# Patient Record
Sex: MALE | Race: WHITE | NOT HISPANIC OR LATINO | URBAN - METROPOLITAN AREA
[De-identification: names, ages, dates, MRNs, and addresses within clinical notes are randomized per-mention and may not be internally consistent; named-entity substitution may affect disease eponyms.]

---

## 2022-03-23 ENCOUNTER — TELEPHONE (OUTPATIENT)
Dept: HEALTH INFORMATION MANAGEMENT | Facility: OTHER | Age: 67
End: 2022-03-23

## 2023-10-12 ENCOUNTER — TELEPHONE (OUTPATIENT)
Dept: HEALTH INFORMATION MANAGEMENT | Facility: OTHER | Age: 68
End: 2023-10-12

## 2024-08-12 SDOH — HEALTH STABILITY: PHYSICAL HEALTH: ON AVERAGE, HOW MANY DAYS PER WEEK DO YOU ENGAGE IN MODERATE TO STRENUOUS EXERCISE (LIKE A BRISK WALK)?: 3 DAYS

## 2024-08-12 SDOH — ECONOMIC STABILITY: TRANSPORTATION INSECURITY
IN THE PAST 12 MONTHS, HAS THE LACK OF TRANSPORTATION KEPT YOU FROM MEDICAL APPOINTMENTS OR FROM GETTING MEDICATIONS?: NO

## 2024-08-12 SDOH — ECONOMIC STABILITY: FOOD INSECURITY: WITHIN THE PAST 12 MONTHS, THE FOOD YOU BOUGHT JUST DIDN'T LAST AND YOU DIDN'T HAVE MONEY TO GET MORE.: NEVER TRUE

## 2024-08-12 SDOH — ECONOMIC STABILITY: TRANSPORTATION INSECURITY
IN THE PAST 12 MONTHS, HAS LACK OF TRANSPORTATION KEPT YOU FROM MEETINGS, WORK, OR FROM GETTING THINGS NEEDED FOR DAILY LIVING?: NO

## 2024-08-12 SDOH — ECONOMIC STABILITY: HOUSING INSECURITY
IN THE LAST 12 MONTHS, WAS THERE A TIME WHEN YOU DID NOT HAVE A STEADY PLACE TO SLEEP OR SLEPT IN A SHELTER (INCLUDING NOW)?: NO

## 2024-08-12 SDOH — HEALTH STABILITY: PHYSICAL HEALTH: ON AVERAGE, HOW MANY MINUTES DO YOU ENGAGE IN EXERCISE AT THIS LEVEL?: 120 MIN

## 2024-08-12 SDOH — ECONOMIC STABILITY: HOUSING INSECURITY: IN THE LAST 12 MONTHS, HOW MANY PLACES HAVE YOU LIVED?: 1

## 2024-08-12 SDOH — HEALTH STABILITY: MENTAL HEALTH
STRESS IS WHEN SOMEONE FEELS TENSE, NERVOUS, ANXIOUS, OR CAN'T SLEEP AT NIGHT BECAUSE THEIR MIND IS TROUBLED. HOW STRESSED ARE YOU?: ONLY A LITTLE

## 2024-08-12 SDOH — ECONOMIC STABILITY: INCOME INSECURITY: IN THE LAST 12 MONTHS, WAS THERE A TIME WHEN YOU WERE NOT ABLE TO PAY THE MORTGAGE OR RENT ON TIME?: NO

## 2024-08-12 SDOH — ECONOMIC STABILITY: FOOD INSECURITY: WITHIN THE PAST 12 MONTHS, YOU WORRIED THAT YOUR FOOD WOULD RUN OUT BEFORE YOU GOT MONEY TO BUY MORE.: NEVER TRUE

## 2024-08-12 SDOH — ECONOMIC STABILITY: TRANSPORTATION INSECURITY
IN THE PAST 12 MONTHS, HAS LACK OF RELIABLE TRANSPORTATION KEPT YOU FROM MEDICAL APPOINTMENTS, MEETINGS, WORK OR FROM GETTING THINGS NEEDED FOR DAILY LIVING?: NO

## 2024-08-12 ASSESSMENT — SOCIAL DETERMINANTS OF HEALTH (SDOH)
HOW OFTEN DO YOU ATTENT MEETINGS OF THE CLUB OR ORGANIZATION YOU BELONG TO?: MORE THAN 4 TIMES PER YEAR
WITHIN THE PAST 12 MONTHS, YOU WORRIED THAT YOUR FOOD WOULD RUN OUT BEFORE YOU GOT THE MONEY TO BUY MORE: NEVER TRUE
HOW OFTEN DO YOU HAVE SIX OR MORE DRINKS ON ONE OCCASION: NEVER
IN A TYPICAL WEEK, HOW MANY TIMES DO YOU TALK ON THE PHONE WITH FAMILY, FRIENDS, OR NEIGHBORS?: TWICE A WEEK
HOW MANY DRINKS CONTAINING ALCOHOL DO YOU HAVE ON A TYPICAL DAY WHEN YOU ARE DRINKING: 1 OR 2
IN THE PAST 12 MONTHS, HAS THE ELECTRIC, GAS, OIL, OR WATER COMPANY THREATENED TO SHUT OFF SERVICE IN YOUR HOME?: NO
DO YOU BELONG TO ANY CLUBS OR ORGANIZATIONS SUCH AS CHURCH GROUPS UNIONS, FRATERNAL OR ATHLETIC GROUPS, OR SCHOOL GROUPS?: YES
HOW OFTEN DO YOU HAVE A DRINK CONTAINING ALCOHOL: 2-4 TIMES A MONTH
DO YOU BELONG TO ANY CLUBS OR ORGANIZATIONS SUCH AS CHURCH GROUPS UNIONS, FRATERNAL OR ATHLETIC GROUPS, OR SCHOOL GROUPS?: YES
HOW OFTEN DO YOU ATTEND CHURCH OR RELIGIOUS SERVICES?: NEVER
HOW OFTEN DO YOU GET TOGETHER WITH FRIENDS OR RELATIVES?: ONCE A WEEK
HOW OFTEN DO YOU GET TOGETHER WITH FRIENDS OR RELATIVES?: ONCE A WEEK
HOW OFTEN DO YOU ATTENT MEETINGS OF THE CLUB OR ORGANIZATION YOU BELONG TO?: MORE THAN 4 TIMES PER YEAR
IN A TYPICAL WEEK, HOW MANY TIMES DO YOU TALK ON THE PHONE WITH FAMILY, FRIENDS, OR NEIGHBORS?: TWICE A WEEK
HOW OFTEN DO YOU ATTEND CHURCH OR RELIGIOUS SERVICES?: NEVER

## 2024-08-12 ASSESSMENT — LIFESTYLE VARIABLES
HOW OFTEN DO YOU HAVE SIX OR MORE DRINKS ON ONE OCCASION: NEVER
HOW MANY STANDARD DRINKS CONTAINING ALCOHOL DO YOU HAVE ON A TYPICAL DAY: 1 OR 2
AUDIT-C TOTAL SCORE: 2
SKIP TO QUESTIONS 9-10: 1
HOW OFTEN DO YOU HAVE A DRINK CONTAINING ALCOHOL: 2-4 TIMES A MONTH

## 2024-08-15 ENCOUNTER — OFFICE VISIT (OUTPATIENT)
Dept: MEDICAL GROUP | Facility: IMAGING CENTER | Age: 69
End: 2024-08-15
Payer: MEDICARE

## 2024-08-15 VITALS
SYSTOLIC BLOOD PRESSURE: 96 MMHG | TEMPERATURE: 98.4 F | BODY MASS INDEX: 25.09 KG/M2 | OXYGEN SATURATION: 94 % | HEIGHT: 69 IN | DIASTOLIC BLOOD PRESSURE: 60 MMHG | RESPIRATION RATE: 16 BRPM | HEART RATE: 73 BPM | WEIGHT: 169.4 LBS

## 2024-08-15 DIAGNOSIS — Z80.42 FAMILY HISTORY OF PROSTATE CANCER: ICD-10-CM

## 2024-08-15 DIAGNOSIS — Z76.89 ENCOUNTER TO ESTABLISH CARE: ICD-10-CM

## 2024-08-15 DIAGNOSIS — T78.40XA ALLERGY, INITIAL ENCOUNTER: ICD-10-CM

## 2024-08-15 DIAGNOSIS — Z13.1 DIABETES MELLITUS SCREENING: ICD-10-CM

## 2024-08-15 DIAGNOSIS — Z12.5 PROSTATE CANCER SCREENING: ICD-10-CM

## 2024-08-15 DIAGNOSIS — Z13.6 SCREENING FOR CARDIOVASCULAR CONDITION: ICD-10-CM

## 2024-08-15 DIAGNOSIS — Z80.0 FAMILY HISTORY OF COLON CANCER: ICD-10-CM

## 2024-08-15 DIAGNOSIS — Z00.00 ROUTINE GENERAL MEDICAL EXAMINATION AT HEALTH CARE FACILITY: ICD-10-CM

## 2024-08-15 DIAGNOSIS — Z11.59 NEED FOR HEPATITIS C SCREENING TEST: ICD-10-CM

## 2024-08-15 PROCEDURE — 99203 OFFICE O/P NEW LOW 30 MIN: CPT | Performed by: INTERNAL MEDICINE

## 2024-08-15 ASSESSMENT — PATIENT HEALTH QUESTIONNAIRE - PHQ9: CLINICAL INTERPRETATION OF PHQ2 SCORE: 0

## 2024-08-15 NOTE — PROGRESS NOTES
"New Patient to Establish    Reason to establish: New patient to establish    Rashad Andujar is a 69 y.o. male who presents today with the following:    CC:   Chief Complaint   Patient presents with    Establish Care    Seasonal Allergies     Runny nose and itchy eyes        HPI:       His is allergic to dogs, mold, dust, certain foods (chocolate), etc.  He takes care his son's (Tolu) dogs (Shadow and Light) once in a while.   Itchy eyes, runny nose, postnasal drip  OTC Antihistamine such benadryl    GERD, well controlled with lifestyle.    Problem   Family History of Colon Cancer    Mother had colon cancer  He reports he had colonoscopy three years ago (GI consultant in [place]? ) Requests records     Family History of Prostate Cancer    Brother has prostate cancer     Allergies    His is allergic to dogs, mold, dust, certain foods (chocolate), etc.  He takes care his son's (Tolu) dogs (Shadow and Light) once in a while.   Itchy eyes, runny nose, postnasal drip  Flonase did not work well  Sinus rinses prn  OTC Antihistamine such benadryl helps             History of Present Illness          No current outpatient medications on file.    Allergies, past medical history, past surgical history, medications, family history, social history reviewed and updated.    ROS Please see HPI    Physical Exam  BP 96/60 (BP Location: Left arm, Patient Position: Sitting, BP Cuff Size: Adult)   Pulse 73   Temp 36.9 °C (98.4 °F) (Temporal)   Resp 16   Ht 1.753 m (5' 9\")   Wt 76.8 kg (169 lb 6.4 oz)   SpO2 94%   BMI 25.02 kg/m²   Physical Exam  Constitutional:       General: He is not in acute distress.     Appearance: Normal appearance.   HENT:      Head: Normocephalic and atraumatic.      Nose: Nose normal.      Mouth/Throat:      Pharynx: Oropharynx is clear.   Eyes:      Extraocular Movements: Extraocular movements intact.      Conjunctiva/sclera: Conjunctivae normal.   Cardiovascular:      Rate and Rhythm: " Normal rate and regular rhythm.   Pulmonary:      Effort: Pulmonary effort is normal.      Breath sounds: Normal breath sounds.   Abdominal:      General: Bowel sounds are normal.      Palpations: Abdomen is soft.      Tenderness: There is no abdominal tenderness.   Musculoskeletal:      Right lower leg: No edema.      Left lower leg: No edema.   Skin:     Comments: Skin lesions on face and extremities   Neurological:      Mental Status: He is alert. Mental status is at baseline.   Psychiatric:         Mood and Affect: Mood normal.         Behavior: Behavior normal.         Thought Content: Thought content normal.         Judgment: Judgment normal.         Assessment and Plan    1. Encounter to establish care    2. Screening for cardiovascular condition  - Lipid Profile; Future    3. Family history of colon cancer  Mother had colon cancer  He reports he had colonoscopy three years ago (GI consultant in [place]? ) Requests records    4. Family history of prostate cancer  5. Prostate cancer screening  - PROSTATE SPECIFIC AG SCREENING; Future    6. Diabetes mellitus screening  - Comp Metabolic Panel; Future    7. Need for hepatitis C screening test  - HEP C VIRUS ANTIBODY; Future    8. Allergy, initial encounter  Avoid triggers  OTC antihistamine prn    9. Routine general medical examination at health care facility  - CBC WITH DIFFERENTIAL; Future  - Comp Metabolic Panel; Future  - TSH WITH REFLEX TO FT4; Future  - URINALYSIS,CULTURE IF INDICATED; Future    He follows derm for his skin lesions      Follow-up:Return in about 1 month (around 9/15/2024), or if symptoms worsen or fail to improve.    This note was created using voice recognition software. There may be unintended errors in spelling, grammar or content.

## 2024-08-15 NOTE — ASSESSMENT & PLAN NOTE
Mother had colon cancer  He reports he had colonoscopy three years ago (GI consultant in [place]? ) Requests records

## 2024-08-15 NOTE — ASSESSMENT & PLAN NOTE
His is allergic to dogs, mold, dust, certain foods (chocolate), etc.  He takes care his son's (Tolu) dogs (Shadow and Light) once in a while.   Itchy eyes, runny nose, postnasal drip  Flonase did not work well  Sinus rinses prn  OTC Antihistamine such benadryl helps

## 2024-08-26 ENCOUNTER — APPOINTMENT (OUTPATIENT)
Dept: LAB | Facility: MEDICAL CENTER | Age: 69
End: 2024-08-26
Payer: MEDICARE

## 2024-09-03 ENCOUNTER — HOSPITAL ENCOUNTER (OUTPATIENT)
Dept: LAB | Facility: MEDICAL CENTER | Age: 69
End: 2024-09-03
Attending: INTERNAL MEDICINE
Payer: MEDICARE

## 2024-09-03 DIAGNOSIS — Z11.59 NEED FOR HEPATITIS C SCREENING TEST: ICD-10-CM

## 2024-09-03 DIAGNOSIS — Z12.5 PROSTATE CANCER SCREENING: ICD-10-CM

## 2024-09-03 DIAGNOSIS — Z13.6 SCREENING FOR CARDIOVASCULAR CONDITION: ICD-10-CM

## 2024-09-03 DIAGNOSIS — Z13.1 DIABETES MELLITUS SCREENING: ICD-10-CM

## 2024-09-03 DIAGNOSIS — Z00.00 ROUTINE GENERAL MEDICAL EXAMINATION AT HEALTH CARE FACILITY: ICD-10-CM

## 2024-09-03 DIAGNOSIS — Z80.42 FAMILY HISTORY OF PROSTATE CANCER: ICD-10-CM

## 2024-09-03 DIAGNOSIS — R97.20 ELEVATED PSA: ICD-10-CM

## 2024-09-03 DIAGNOSIS — R31.21 ASYMPTOMATIC MICROSCOPIC HEMATURIA: ICD-10-CM

## 2024-09-03 LAB
ALBUMIN SERPL BCP-MCNC: 3.8 G/DL (ref 3.2–4.9)
ALBUMIN/GLOB SERPL: 1.5 G/DL
ALP SERPL-CCNC: 61 U/L (ref 30–99)
ALT SERPL-CCNC: 12 U/L (ref 2–50)
ANION GAP SERPL CALC-SCNC: 10 MMOL/L (ref 7–16)
APPEARANCE UR: CLEAR
AST SERPL-CCNC: 19 U/L (ref 12–45)
BACTERIA #/AREA URNS HPF: NEGATIVE /HPF
BASOPHILS # BLD AUTO: 0.7 % (ref 0–1.8)
BASOPHILS # BLD: 0.04 K/UL (ref 0–0.12)
BILIRUB SERPL-MCNC: 0.9 MG/DL (ref 0.1–1.5)
BILIRUB UR QL STRIP.AUTO: NEGATIVE
BUN SERPL-MCNC: 17 MG/DL (ref 8–22)
CALCIUM ALBUM COR SERPL-MCNC: 8.9 MG/DL (ref 8.5–10.5)
CALCIUM SERPL-MCNC: 8.7 MG/DL (ref 8.5–10.5)
CHLORIDE SERPL-SCNC: 105 MMOL/L (ref 96–112)
CHOLEST SERPL-MCNC: 159 MG/DL (ref 100–199)
CO2 SERPL-SCNC: 23 MMOL/L (ref 20–33)
COLOR UR: YELLOW
CREAT SERPL-MCNC: 1.1 MG/DL (ref 0.5–1.4)
EOSINOPHIL # BLD AUTO: 0.25 K/UL (ref 0–0.51)
EOSINOPHIL NFR BLD: 4.5 % (ref 0–6.9)
EPI CELLS #/AREA URNS HPF: NEGATIVE /HPF
ERYTHROCYTE [DISTWIDTH] IN BLOOD BY AUTOMATED COUNT: 43.4 FL (ref 35.9–50)
GFR SERPLBLD CREATININE-BSD FMLA CKD-EPI: 73 ML/MIN/1.73 M 2
GLOBULIN SER CALC-MCNC: 2.6 G/DL (ref 1.9–3.5)
GLUCOSE SERPL-MCNC: 98 MG/DL (ref 65–99)
GLUCOSE UR STRIP.AUTO-MCNC: NEGATIVE MG/DL
HCT VFR BLD AUTO: 45.7 % (ref 42–52)
HCV AB SER QL: NORMAL
HDLC SERPL-MCNC: 42 MG/DL
HGB BLD-MCNC: 15.6 G/DL (ref 14–18)
HYALINE CASTS #/AREA URNS LPF: ABNORMAL /LPF
IMM GRANULOCYTES # BLD AUTO: 0.01 K/UL (ref 0–0.11)
IMM GRANULOCYTES NFR BLD AUTO: 0.2 % (ref 0–0.9)
KETONES UR STRIP.AUTO-MCNC: 15 MG/DL
LDLC SERPL CALC-MCNC: 108 MG/DL
LEUKOCYTE ESTERASE UR QL STRIP.AUTO: NEGATIVE
LYMPHOCYTES # BLD AUTO: 1.33 K/UL (ref 1–4.8)
LYMPHOCYTES NFR BLD: 24.1 % (ref 22–41)
MCH RBC QN AUTO: 32 PG (ref 27–33)
MCHC RBC AUTO-ENTMCNC: 34.1 G/DL (ref 32.3–36.5)
MCV RBC AUTO: 93.8 FL (ref 81.4–97.8)
MICRO URNS: ABNORMAL
MONOCYTES # BLD AUTO: 0.4 K/UL (ref 0–0.85)
MONOCYTES NFR BLD AUTO: 7.3 % (ref 0–13.4)
NEUTROPHILS # BLD AUTO: 3.48 K/UL (ref 1.82–7.42)
NEUTROPHILS NFR BLD: 63.2 % (ref 44–72)
NITRITE UR QL STRIP.AUTO: NEGATIVE
NRBC # BLD AUTO: 0 K/UL
NRBC BLD-RTO: 0 /100 WBC (ref 0–0.2)
PH UR STRIP.AUTO: 5.5 [PH] (ref 5–8)
PLATELET # BLD AUTO: 205 K/UL (ref 164–446)
PMV BLD AUTO: 11.2 FL (ref 9–12.9)
POTASSIUM SERPL-SCNC: 4.5 MMOL/L (ref 3.6–5.5)
PROT SERPL-MCNC: 6.4 G/DL (ref 6–8.2)
PROT UR QL STRIP: NEGATIVE MG/DL
PSA SERPL-MCNC: 6.03 NG/ML (ref 0–4)
RBC # BLD AUTO: 4.87 M/UL (ref 4.7–6.1)
RBC # URNS HPF: ABNORMAL /HPF
RBC UR QL AUTO: ABNORMAL
SODIUM SERPL-SCNC: 138 MMOL/L (ref 135–145)
SP GR UR STRIP.AUTO: 1.03
TRIGL SERPL-MCNC: 47 MG/DL (ref 0–149)
TSH SERPL DL<=0.005 MIU/L-ACNC: 0.94 UIU/ML (ref 0.38–5.33)
UROBILINOGEN UR STRIP.AUTO-MCNC: 0.2 MG/DL
WBC # BLD AUTO: 5.5 K/UL (ref 4.8–10.8)
WBC #/AREA URNS HPF: ABNORMAL /HPF

## 2024-09-03 PROCEDURE — 80061 LIPID PANEL: CPT

## 2024-09-03 PROCEDURE — 84443 ASSAY THYROID STIM HORMONE: CPT

## 2024-09-03 PROCEDURE — 81001 URINALYSIS AUTO W/SCOPE: CPT

## 2024-09-03 PROCEDURE — 84153 ASSAY OF PSA TOTAL: CPT

## 2024-09-03 PROCEDURE — 86803 HEPATITIS C AB TEST: CPT

## 2024-09-03 PROCEDURE — 85025 COMPLETE CBC W/AUTO DIFF WBC: CPT

## 2024-09-03 PROCEDURE — 80053 COMPREHEN METABOLIC PANEL: CPT

## 2024-09-03 PROCEDURE — 36415 COLL VENOUS BLD VENIPUNCTURE: CPT

## 2024-09-04 NOTE — PROGRESS NOTES
Diagnoses and all orders for this visit:    Elevated PSA  -     Referral to Urology   Latest Reference Range & Units 09/03/24 12:22   Prostatic Specific Antigen Tot 0.00 - 4.00 ng/mL 6.03 (H)   (H): Data is abnormally high    Asymptomatic microscopic hematuria  -     Referral to Urology   Latest Reference Range & Units 09/03/24 12:22   Color  Yellow   Character  Clear   Specific Gravity <1.035  1.027   Ph 5.0 - 8.0  5.5   Glucose Negative mg/dL Negative   Ketones Negative mg/dL 15 !   Bilirubin Negative  Negative   Occult Blood Negative  Trace !   Protein Negative mg/dL Negative   Nitrite Negative  Negative   Leukocyte Esterase Negative  Negative   Urobilinogen, Urine Negative  0.2   Micro Urine Req  Microscopic   WBC /hpf 0-2 !   RBC /hpf 2-5 !   Epithelial Cells /hpf Negative   Bacteria None /hpf Negative   Hyaline Cast /lpf 0-2   !: Data is abnormal      Family history of prostate cancer  His brother has prostate cancer  -     Referral to Urology

## 2024-09-12 ENCOUNTER — HOSPITAL ENCOUNTER (OUTPATIENT)
Dept: LAB | Facility: MEDICAL CENTER | Age: 69
End: 2024-09-12
Attending: UROLOGY
Payer: MEDICARE

## 2024-09-12 LAB — PSA SERPL-MCNC: 4.91 NG/ML (ref 0–4)

## 2024-09-12 PROCEDURE — 84153 ASSAY OF PSA TOTAL: CPT

## 2024-09-12 PROCEDURE — 36415 COLL VENOUS BLD VENIPUNCTURE: CPT

## 2024-10-11 ENCOUNTER — APPOINTMENT (OUTPATIENT)
Dept: MEDICAL GROUP | Facility: IMAGING CENTER | Age: 69
End: 2024-10-11
Payer: MEDICARE

## 2024-10-11 VITALS
SYSTOLIC BLOOD PRESSURE: 112 MMHG | HEIGHT: 69 IN | DIASTOLIC BLOOD PRESSURE: 78 MMHG | WEIGHT: 167 LBS | OXYGEN SATURATION: 96 % | BODY MASS INDEX: 24.73 KG/M2 | RESPIRATION RATE: 16 BRPM | HEART RATE: 65 BPM | TEMPERATURE: 97.8 F

## 2024-10-11 DIAGNOSIS — R82.90 ABNORMAL FINDING ON URINALYSIS: ICD-10-CM

## 2024-10-11 DIAGNOSIS — E78.00 PURE HYPERCHOLESTEROLEMIA: ICD-10-CM

## 2024-10-11 DIAGNOSIS — Z23 NEED FOR VACCINATION: ICD-10-CM

## 2024-10-11 DIAGNOSIS — R97.20 ELEVATED PSA: ICD-10-CM

## 2024-10-11 PROCEDURE — G0008 ADMIN INFLUENZA VIRUS VAC: HCPCS | Performed by: INTERNAL MEDICINE

## 2024-10-11 PROCEDURE — 90662 IIV NO PRSV INCREASED AG IM: CPT | Performed by: INTERNAL MEDICINE

## 2024-10-11 PROCEDURE — 99214 OFFICE O/P EST MOD 30 MIN: CPT | Mod: 25 | Performed by: INTERNAL MEDICINE

## 2024-10-11 ASSESSMENT — FIBROSIS 4 INDEX: FIB4 SCORE: 1.79

## 2024-11-08 ENCOUNTER — APPOINTMENT (OUTPATIENT)
Dept: RADIOLOGY | Facility: MEDICAL CENTER | Age: 69
End: 2024-11-08
Attending: UROLOGY
Payer: MEDICARE

## 2024-11-08 DIAGNOSIS — R97.20 ELEVATED PROSTATE SPECIFIC ANTIGEN (PSA): ICD-10-CM

## 2024-11-08 PROCEDURE — A9579 GAD-BASE MR CONTRAST NOS,1ML: HCPCS | Mod: JZ | Performed by: UROLOGY

## 2024-11-08 PROCEDURE — 700117 HCHG RX CONTRAST REV CODE 255: Mod: JZ | Performed by: UROLOGY

## 2024-11-08 PROCEDURE — 700111 HCHG RX REV CODE 636 W/ 250 OVERRIDE (IP): Mod: JZ,JG | Performed by: RADIOLOGY

## 2024-11-08 PROCEDURE — 72197 MRI PELVIS W/O & W/DYE: CPT

## 2024-11-08 RX ADMIN — GADOTERIDOL 15 ML: 279.3 INJECTION, SOLUTION INTRAVENOUS at 10:48

## 2024-11-08 RX ADMIN — GLUCAGON 1 MG: KIT at 09:45

## 2024-11-18 ENCOUNTER — TELEPHONE (OUTPATIENT)
Dept: HEALTH INFORMATION MANAGEMENT | Facility: OTHER | Age: 69
End: 2024-11-18
Payer: MEDICARE

## 2024-12-16 ENCOUNTER — TELEPHONE (OUTPATIENT)
Dept: MEDICAL GROUP | Facility: IMAGING CENTER | Age: 69
End: 2024-12-16
Payer: MEDICARE

## 2024-12-16 NOTE — TELEPHONE ENCOUNTER
Patient called requesting a referral to [facility]. He already has a appointment scheduled with them.

## 2024-12-17 DIAGNOSIS — T78.40XA ALLERGY, INITIAL ENCOUNTER: ICD-10-CM

## 2024-12-17 NOTE — PROGRESS NOTES
His is allergic to dogs, mold, dust, certain foods (chocolate), etc.  He takes care his son's (Tolu) dogs (Shadow and Light) once in a while.   Itchy eyes, runny nose, postnasal drip  Flonase did not work well  Sinus rinses prn  OTC Antihistamine such benadryl helps    Patient would like to see allergy specialist      Allergy, initial encounter  -     Referral to Allergy

## 2025-02-13 ENCOUNTER — APPOINTMENT (OUTPATIENT)
Dept: MEDICAL GROUP | Facility: IMAGING CENTER | Age: 70
End: 2025-02-13
Payer: MEDICARE

## 2025-02-13 VITALS
HEIGHT: 69 IN | WEIGHT: 167.4 LBS | TEMPERATURE: 97.8 F | RESPIRATION RATE: 16 BRPM | DIASTOLIC BLOOD PRESSURE: 68 MMHG | BODY MASS INDEX: 24.79 KG/M2 | SYSTOLIC BLOOD PRESSURE: 102 MMHG | OXYGEN SATURATION: 97 % | HEART RATE: 68 BPM

## 2025-02-13 DIAGNOSIS — M79.604 PAIN OF RIGHT LOWER EXTREMITY: ICD-10-CM

## 2025-02-13 DIAGNOSIS — S89.91XA INJURY OF RIGHT LOWER EXTREMITY, INITIAL ENCOUNTER: ICD-10-CM

## 2025-02-13 DIAGNOSIS — Z12.83 SKIN CANCER SCREENING: ICD-10-CM

## 2025-02-13 DIAGNOSIS — E78.00 PURE HYPERCHOLESTEROLEMIA: ICD-10-CM

## 2025-02-13 DIAGNOSIS — T14.8XXA BRUISING: ICD-10-CM

## 2025-02-13 PROCEDURE — 3074F SYST BP LT 130 MM HG: CPT | Performed by: INTERNAL MEDICINE

## 2025-02-13 PROCEDURE — 3078F DIAST BP <80 MM HG: CPT | Performed by: INTERNAL MEDICINE

## 2025-02-13 PROCEDURE — 99214 OFFICE O/P EST MOD 30 MIN: CPT | Performed by: INTERNAL MEDICINE

## 2025-02-13 RX ORDER — TAMSULOSIN HYDROCHLORIDE 0.4 MG/1
0.4 CAPSULE ORAL
COMMUNITY
Start: 2025-01-14

## 2025-02-13 ASSESSMENT — FIBROSIS 4 INDEX: FIB4 SCORE: 1.79

## 2025-02-13 ASSESSMENT — PATIENT HEALTH QUESTIONNAIRE - PHQ9: CLINICAL INTERPRETATION OF PHQ2 SCORE: 0

## 2025-02-13 NOTE — PROGRESS NOTES
"Established Patient    Rashad Andujar is a 69 y.o. male who presents today with the following:    CC:   Chief Complaint   Patient presents with    Follow-Up    Other     Pt report pulling hamstring right leg, on Friday. Has not been seen elsewhere for this yet       HPI:     Verbal consent was acquired by the patient to use Art[employer] ambient listening note generation during this visit Yes     History of Present Illness  The patient is a 52-year-old male presenting with a right hamstring injury.    Right Hamstring Injury  He injured his right hamstring on 02/07/2025 while skiing. He has bruising in the posterior thigh and calf, with pain in the hamstring and calves unrelieved by icing and ibuprofen. Stretching worsens the pain, but leg strength is unchanged.  - Onset: Injured on 02/07/2025 while skiing.  - Location: Right hamstring, posterior thigh, and calf.  - Character: Bruising and pain in the hamstring and calves.  - Alleviating/Aggravating Factors: Pain unrelieved by icing and ibuprofen; stretching worsens the pain.  - Severity: Pain persists despite icing and ibuprofen; leg strength is unchanged.    MEDICATIONS  - Ibuprofen    No problems updated.     Current Outpatient Medications   Medication Sig    tamsulosin (FLOMAX) 0.4 MG capsule Take 0.4 mg by mouth every day.     No Known Allergies    Allergies, past medical history, past surgical history, medications, family history, social history reviewed and updated.    ROS Please see HPI    Physical Exam  Vitals: /68 (BP Location: Left arm, Patient Position: Sitting, BP Cuff Size: Adult)   Pulse 68   Temp 36.6 °C (97.8 °F) (Temporal)   Resp 16   Ht 1.753 m (5' 9\")   Wt 75.9 kg (167 lb 6.4 oz)   SpO2 97%   BMI 24.72 kg/m²   Physical Exam  Constitutional:       General: He is not in acute distress.     Appearance: Normal appearance.   HENT:      Head: Normocephalic and atraumatic.      Nose: Nose normal.      Mouth/Throat:      Pharynx: " Oropharynx is clear.   Eyes:      Extraocular Movements: Extraocular movements intact.      Conjunctiva/sclera: Conjunctivae normal.   Cardiovascular:      Rate and Rhythm: Normal rate and regular rhythm.   Pulmonary:      Effort: Pulmonary effort is normal.      Breath sounds: Normal breath sounds.   Abdominal:      General: Bowel sounds are normal.      Palpations: Abdomen is soft.      Tenderness: There is no abdominal tenderness.   Musculoskeletal:      Right lower leg: No edema.      Left lower leg: No edema.      Comments: Tenderness of posterior right thigh and calves, bruises on right posterior right thigh and right calf   Neurological:      Mental Status: He is alert. Mental status is at baseline.   Psychiatric:         Mood and Affect: Mood normal.         Behavior: Behavior normal.         Thought Content: Thought content normal.         Judgment: Judgment normal.             Assessment and Plan    Assessment & Plan      Rashad was seen today for follow-up and other.    Diagnoses and all orders for this visit:    Injury of right lower extremity, initial encounter  Pain of right lower extremity  - Rest, Ice, Compression, Elevation  - consider switching to heating  -  Continue taking ibuprofen or tylenol as needed for pain relief.  -     Referral to Orthopedics  -     US-EXTREMITY VENOUS LOWER UNILAT RIGHT; Future    Skin cancer screening  -     Referral to Dermatology    Pure hypercholesterolemia  -     Lipid Profile; Future  -     Comp Metabolic Panel; Future  Healthful lifestyle measures    Bruising  -     Comp Metabolic Panel; Future  -     Prothrombin Time; Future  -     CBC WITH DIFFERENTIAL; Future  Due to injury      Follow-up:Return in about 3 months (around 5/13/2025), or if symptoms worsen or fail to improve.    This note was created using voice recognition software. There may be unintended errors in spelling, grammar or content.

## 2025-02-13 NOTE — Clinical Note
REFERRAL APPROVAL NOTICE         Sent on February 13, 2025                   Rashad Andujar  [address] [place] [address]                   Dear Mr. Andujar,    After a careful review of the medical information and benefit coverage, [facility] has processed your referral. See below for additional details.    If applicable, you must be actively enrolled with your insurance for coverage of the authorized service. If you have any questions regarding your coverage, please contact your insurance directly.    REFERRAL INFORMATION   Referral #:  [id number]  Referred-To Provider    Referred-By Provider:  Dermatology    DANIEL Abraham MARTIN E      [address] [place] [address] [address] [place] [address]  [phone] [address]  [place] [address]  [phone]    Referral Start Date:  02/13/2025  Referral End Date:   02/13/2026             SCHEDULING  If you do not already have an appointment, please call [phone] to make an appointment.     MORE INFORMATION  If you do not already have a [employer] account, sign up at: [email or web address]  You can access your medical information, make appointments, see lab results, billing information, and more.  If you have questions regarding this referral, please contact  the [facility] Referrals department at:             [phone]. Monday - Friday 8:00AM - 5:00PM.     Sincerely,    [facility]

## 2025-02-13 NOTE — Clinical Note
REFERRAL APPROVAL NOTICE         Sent on February 13, 2025                   Rashad Andujar  [address] [place] [address]                   Dear Mr. Andujar,    After a careful review of the medical information and benefit coverage, [facility] has processed your referral. See below for additional details.    If applicable, you must be actively enrolled with your insurance for coverage of the authorized service. If you have any questions regarding your coverage, please contact your insurance directly.    REFERRAL INFORMATION   Referral #:  [id number]  Referred-To Department    Referred-By Provider:  Orthopedics    Tory Marion M.D.   [facility] Main Totals (joint)      [address] [place] [address]  [phone] [address] [place] [address]  [place] [id number] [phone]    Referral Start Date:  02/13/2025  Referral End Date:   02/13/2026             SCHEDULING  If you do not already have an appointment, please call [phone] to make an appointment.     MORE INFORMATION  If you do not already have a [employer] account, sign up at: [email or web address]  You can access your medical information, make appointments, see lab results, billing information, and more.  If you have questions regarding this referral, please contact  the [facility] Referrals department at:             [phone]. Monday - Friday 8:00AM - 5:00PM.     Sincerely,    [facility]

## 2025-04-30 ENCOUNTER — TELEPHONE (OUTPATIENT)
Dept: MEDICAL GROUP | Facility: IMAGING CENTER | Age: 70
End: 2025-04-30
Payer: MEDICARE

## 2025-04-30 DIAGNOSIS — R97.20 ELEVATED PSA: ICD-10-CM

## 2025-04-30 NOTE — TELEPHONE ENCOUNTER
Caller Name: Rashad Fulton  Call Back Number:     How would the patient prefer to be contacted with a response: Phone call do NOT leave a detailed message    Pt requesting a PSA lab order

## 2025-05-01 ENCOUNTER — HOSPITAL ENCOUNTER (OUTPATIENT)
Dept: LAB | Facility: MEDICAL CENTER | Age: 70
End: 2025-05-01
Attending: INTERNAL MEDICINE
Payer: MEDICARE

## 2025-05-01 ENCOUNTER — RESULTS FOLLOW-UP (OUTPATIENT)
Dept: MEDICAL GROUP | Facility: IMAGING CENTER | Age: 70
End: 2025-05-01

## 2025-05-01 DIAGNOSIS — R82.90 ABNORMAL FINDING ON URINALYSIS: ICD-10-CM

## 2025-05-01 DIAGNOSIS — T14.8XXA BRUISING: ICD-10-CM

## 2025-05-01 DIAGNOSIS — E78.00 PURE HYPERCHOLESTEROLEMIA: ICD-10-CM

## 2025-05-01 DIAGNOSIS — R97.20 ELEVATED PSA: ICD-10-CM

## 2025-05-01 LAB
ALBUMIN SERPL BCP-MCNC: 4 G/DL (ref 3.2–4.9)
ALBUMIN/GLOB SERPL: 1.5 G/DL
ALP SERPL-CCNC: 60 U/L (ref 30–99)
ALT SERPL-CCNC: 17 U/L (ref 2–50)
ANION GAP SERPL CALC-SCNC: 9 MMOL/L (ref 7–16)
APPEARANCE UR: CLEAR
AST SERPL-CCNC: 25 U/L (ref 12–45)
BASOPHILS # BLD AUTO: 0.9 % (ref 0–1.8)
BASOPHILS # BLD: 0.05 K/UL (ref 0–0.12)
BILIRUB SERPL-MCNC: 0.8 MG/DL (ref 0.1–1.5)
BILIRUB UR QL STRIP.AUTO: NEGATIVE
BUN SERPL-MCNC: 17 MG/DL (ref 8–22)
CALCIUM ALBUM COR SERPL-MCNC: 9.1 MG/DL (ref 8.5–10.5)
CALCIUM SERPL-MCNC: 9.1 MG/DL (ref 8.5–10.5)
CHLORIDE SERPL-SCNC: 106 MMOL/L (ref 96–112)
CHOLEST SERPL-MCNC: 186 MG/DL (ref 100–199)
CO2 SERPL-SCNC: 25 MMOL/L (ref 20–33)
COLOR UR: YELLOW
CREAT SERPL-MCNC: 1.23 MG/DL (ref 0.5–1.4)
EOSINOPHIL # BLD AUTO: 0.31 K/UL (ref 0–0.51)
EOSINOPHIL NFR BLD: 5.8 % (ref 0–6.9)
ERYTHROCYTE [DISTWIDTH] IN BLOOD BY AUTOMATED COUNT: 44.5 FL (ref 35.9–50)
FASTING STATUS PATIENT QL REPORTED: NORMAL
GFR SERPLBLD CREATININE-BSD FMLA CKD-EPI: 64 ML/MIN/1.73 M 2
GLOBULIN SER CALC-MCNC: 2.6 G/DL (ref 1.9–3.5)
GLUCOSE SERPL-MCNC: 92 MG/DL (ref 65–99)
GLUCOSE UR STRIP.AUTO-MCNC: NEGATIVE MG/DL
HCT VFR BLD AUTO: 49.6 % (ref 42–52)
HDLC SERPL-MCNC: 46 MG/DL
HGB BLD-MCNC: 16.8 G/DL (ref 14–18)
IMM GRANULOCYTES # BLD AUTO: 0.02 K/UL (ref 0–0.11)
IMM GRANULOCYTES NFR BLD AUTO: 0.4 % (ref 0–0.9)
INR PPP: 1 (ref 0.87–1.13)
KETONES UR STRIP.AUTO-MCNC: NEGATIVE MG/DL
LDLC SERPL CALC-MCNC: 125 MG/DL
LEUKOCYTE ESTERASE UR QL STRIP.AUTO: NEGATIVE
LYMPHOCYTES # BLD AUTO: 1.6 K/UL (ref 1–4.8)
LYMPHOCYTES NFR BLD: 30 % (ref 22–41)
MCH RBC QN AUTO: 31.8 PG (ref 27–33)
MCHC RBC AUTO-ENTMCNC: 33.9 G/DL (ref 32.3–36.5)
MCV RBC AUTO: 93.9 FL (ref 81.4–97.8)
MICRO URNS: NORMAL
MONOCYTES # BLD AUTO: 0.41 K/UL (ref 0–0.85)
MONOCYTES NFR BLD AUTO: 7.7 % (ref 0–13.4)
NEUTROPHILS # BLD AUTO: 2.95 K/UL (ref 1.82–7.42)
NEUTROPHILS NFR BLD: 55.2 % (ref 44–72)
NITRITE UR QL STRIP.AUTO: NEGATIVE
NRBC # BLD AUTO: 0 K/UL
NRBC BLD-RTO: 0 /100 WBC (ref 0–0.2)
PH UR STRIP.AUTO: 7 [PH] (ref 5–8)
PLATELET # BLD AUTO: 229 K/UL (ref 164–446)
PMV BLD AUTO: 11.1 FL (ref 9–12.9)
POTASSIUM SERPL-SCNC: 4.2 MMOL/L (ref 3.6–5.5)
PROT SERPL-MCNC: 6.6 G/DL (ref 6–8.2)
PROT UR QL STRIP: NEGATIVE MG/DL
PROTHROMBIN TIME: 13.2 SEC (ref 12–14.6)
PSA SERPL DL<=0.01 NG/ML-MCNC: 4.85 NG/ML (ref 0–4)
RBC # BLD AUTO: 5.28 M/UL (ref 4.7–6.1)
RBC UR QL AUTO: NEGATIVE
SODIUM SERPL-SCNC: 140 MMOL/L (ref 135–145)
SP GR UR STRIP.AUTO: 1.02
TRIGL SERPL-MCNC: 76 MG/DL (ref 0–149)
UROBILINOGEN UR STRIP.AUTO-MCNC: 1 EU/DL
WBC # BLD AUTO: 5.3 K/UL (ref 4.8–10.8)

## 2025-05-01 PROCEDURE — 36415 COLL VENOUS BLD VENIPUNCTURE: CPT

## 2025-05-01 PROCEDURE — 85610 PROTHROMBIN TIME: CPT

## 2025-05-01 PROCEDURE — 84153 ASSAY OF PSA TOTAL: CPT

## 2025-05-01 PROCEDURE — 80053 COMPREHEN METABOLIC PANEL: CPT

## 2025-05-01 PROCEDURE — 81003 URINALYSIS AUTO W/O SCOPE: CPT

## 2025-05-01 PROCEDURE — 85025 COMPLETE CBC W/AUTO DIFF WBC: CPT

## 2025-05-01 PROCEDURE — 80061 LIPID PANEL: CPT

## 2025-05-15 ENCOUNTER — HOSPITAL ENCOUNTER (OUTPATIENT)
Facility: MEDICAL CENTER | Age: 70
End: 2025-05-15
Payer: MEDICARE

## 2025-05-15 LAB
APPEARANCE UR: CLEAR
BILIRUB UR QL STRIP.AUTO: NEGATIVE
COLOR UR: YELLOW
GLUCOSE UR STRIP.AUTO-MCNC: NEGATIVE MG/DL
KETONES UR STRIP.AUTO-MCNC: NEGATIVE MG/DL
LEUKOCYTE ESTERASE UR QL STRIP.AUTO: NEGATIVE
MICRO URNS: NORMAL
NITRITE UR QL STRIP.AUTO: NEGATIVE
PH UR STRIP.AUTO: 7 [PH] (ref 5–8)
PROT UR QL STRIP: NEGATIVE MG/DL
RBC UR QL AUTO: NEGATIVE
SP GR UR STRIP.AUTO: 1.02
UROBILINOGEN UR STRIP.AUTO-MCNC: 0.2 EU/DL

## 2025-05-15 PROCEDURE — 81003 URINALYSIS AUTO W/O SCOPE: CPT

## 2025-05-17 ENCOUNTER — OFFICE VISIT (OUTPATIENT)
Dept: URGENT CARE | Facility: CLINIC | Age: 70
End: 2025-05-17
Payer: MEDICARE

## 2025-05-17 VITALS
DIASTOLIC BLOOD PRESSURE: 70 MMHG | BODY MASS INDEX: 24.44 KG/M2 | WEIGHT: 165 LBS | TEMPERATURE: 98.1 F | HEART RATE: 74 BPM | RESPIRATION RATE: 18 BRPM | SYSTOLIC BLOOD PRESSURE: 128 MMHG | HEIGHT: 69 IN | OXYGEN SATURATION: 96 %

## 2025-05-17 DIAGNOSIS — L03.012 PARONYCHIA OF FINGER OF LEFT HAND: Primary | ICD-10-CM

## 2025-05-17 PROCEDURE — 99213 OFFICE O/P EST LOW 20 MIN: CPT

## 2025-05-17 PROCEDURE — 3074F SYST BP LT 130 MM HG: CPT

## 2025-05-17 PROCEDURE — 3078F DIAST BP <80 MM HG: CPT

## 2025-05-17 RX ORDER — ALBUTEROL SULFATE 90 UG/1
INHALANT RESPIRATORY (INHALATION)
COMMUNITY

## 2025-05-17 ASSESSMENT — FIBROSIS 4 INDEX: FIB4 SCORE: 1.8

## 2025-05-17 NOTE — PROGRESS NOTES
"Subjective:   Rashad Andujar is a 70 y.o. male who presents for Nail Problem (Swollen, redness, not draining, believes the nail is involved, x 2 days, Lt middle finger )      HPI:    Patient presents to urgent care with his SO with concerns of pain, tenderness, swelling the lateral fold of the left middle finger. Has been soaking finger in hydrogen peroxide. Denies fever, chills, nausea. .     ROS As above in HPI    Medications:    Medications Ordered Prior to Encounter[1]     Allergies:   Patient has no known allergies.    Problem List:   Problem List[2]     Surgical History:  Past Surgical History:   Procedure Laterality Date    CATARACT PHACO WITH IOL  02/26/2013    Performed by Ignacio Zhang M.D. at SUR[facility]ERY [facility] ORS    EGD WITH ASP/BX  11/07/2008    Performed by RIO HOYOS at [facility] ORS    ORIF, FRACTURE, HUMERUS  01/01/1988    left    BOWEL RESECTION      ORIF, FRACTURE, FIBULA Left        Past Social Hx:   Social History[3]       Problem list, medications, and allergies reviewed by myself today in Epic.     Objective:     /70 (BP Location: Left arm, Patient Position: Sitting, BP Cuff Size: Adult)   Pulse 74   Temp 36.7 °C (98.1 °F) (Temporal)   Resp 18   Ht 1.753 m (5' 9\")   Wt 74.8 kg (165 lb)   SpO2 96%   BMI 24.37 kg/m²     Physical Exam  Vitals and nursing note reviewed.   Constitutional:       Appearance: Normal appearance.   HENT:      Head: Normocephalic.   Cardiovascular:      Rate and Rhythm: Normal rate and regular rhythm.      Heart sounds: Normal heart sounds.   Pulmonary:      Effort: Pulmonary effort is normal.      Breath sounds: Normal breath sounds.   Musculoskeletal:        Hands:    Skin:     Comments: Medial nailfold of left 3rd finger is edematous, erythematous, warm, and tender to palpation. Not fluctuant. No drainage present.   Neurological:      Mental Status: He is alert.         Assessment/Plan:       Diagnosis and associated " orders:   1. Paronychia of finger of left hand  - amoxicillin-clavulanate (AUGMENTIN) 875-125 MG Tab; Take 1 Tablet by mouth 2 times a day for 7 days.  Dispense: 14 Tablet; Refill: 0    Other orders  - albuterol 108 (90 Base) MCG/ACT Aero Soln inhalation aerosol; INHALE 2 PUFFS BY MOUTH EVERY 4 TO 6 HOURS AS NEEDED        Comments/MDM:     Paronychia without significant abscess of left 3rd finger  Home wound care instructions reviewed: Wash with unscented soap twice a day pat dry, apply Bactroban or Neosporin to cuticle twice a day.  Soak hand in warm water times a day.  May use NSAIDs/Tylenol for pain control and inflammation per package instructions.  Elevate extremity.  Will start patient on Augmentin twice a day for 5 days.    Return to urgent care as needed         Return to clinic or go to ED if symptoms worsen or persist. Indications for ED discussed at length. Patient/Parent/Guardian voices understanding. Follow-up with your primary care provider in 3-5 days. Red flag symptoms discussed. All side effects of medication discussed including allergic response, GI upset, tendon injury, rash, sedation etc.    Please note that this dictation was created using voice recognition software. I have made a reasonable attempt to correct obvious errors, but I expect that there are errors of grammar and possibly content that I did not discover before finalizing the note.    This note was electronically signed by SEGUN Coffman         [1]   Current Outpatient Medications on File Prior to Visit   Medication Sig Dispense Refill    tamsulosin (FLOMAX) 0.4 MG capsule Take 0.4 mg by mouth every day.      albuterol 108 (90 Base) MCG/ACT Aero Soln inhalation aerosol INHALE 2 PUFFS BY MOUTH EVERY 4 TO 6 HOURS AS NEEDED       No current facility-administered medications on file prior to visit.   [2]   Patient Active Problem List  Diagnosis    Family history of colon cancer    Family history of prostate cancer     Allergies    Elevated PSA    Pure hypercholesterolemia   [3]   Social History  Tobacco Use    Smoking status: Never    Smokeless tobacco: Never   Vaping Use    Vaping status: Never Used   Substance Use Topics    Alcohol use: Yes     Alcohol/week: 1.2 oz     Types: 2 Cans of beer per week     Comment: mod    Drug use: Never

## 2025-05-20 ENCOUNTER — APPOINTMENT (OUTPATIENT)
Dept: MEDICAL GROUP | Facility: IMAGING CENTER | Age: 70
End: 2025-05-20
Payer: MEDICARE

## 2025-07-31 ENCOUNTER — APPOINTMENT (OUTPATIENT)
Dept: MEDICAL GROUP | Facility: IMAGING CENTER | Age: 70
End: 2025-07-31
Payer: MEDICARE

## 2025-07-31 VITALS
HEART RATE: 82 BPM | HEIGHT: 69 IN | DIASTOLIC BLOOD PRESSURE: 68 MMHG | WEIGHT: 168.6 LBS | TEMPERATURE: 98.6 F | SYSTOLIC BLOOD PRESSURE: 108 MMHG | RESPIRATION RATE: 16 BRPM | OXYGEN SATURATION: 95 % | BODY MASS INDEX: 24.97 KG/M2

## 2025-07-31 DIAGNOSIS — R97.20 ELEVATED PSA: ICD-10-CM

## 2025-07-31 DIAGNOSIS — E78.00 PURE HYPERCHOLESTEROLEMIA: ICD-10-CM

## 2025-07-31 DIAGNOSIS — T78.40XA ALLERGY, INITIAL ENCOUNTER: ICD-10-CM

## 2025-07-31 DIAGNOSIS — N40.0 BENIGN PROSTATIC HYPERPLASIA, UNSPECIFIED WHETHER LOWER URINARY TRACT SYMPTOMS PRESENT: ICD-10-CM

## 2025-07-31 DIAGNOSIS — Z13.1 DIABETES MELLITUS SCREENING: ICD-10-CM

## 2025-07-31 DIAGNOSIS — Z23 NEED FOR VACCINATION: ICD-10-CM

## 2025-07-31 ASSESSMENT — FIBROSIS 4 INDEX: FIB4 SCORE: 1.8

## 2025-07-31 NOTE — PROGRESS NOTES
Established Patient    Rashad Andujar is a 70 y.o. male who presents today with the following:    CC:   Chief Complaint   Patient presents with    Follow-Up     No concerns         HPI:     Verbal consent was acquired by the patient to use [employer] ambient listening note generation during this visit Yes     History of Present Illness  The patient presents for benign prostatic hyperplasia, allergies, and health maintenance.    Elevated PSA  He recently had a follow-up with his urologist, where his PSA levels remained stable.  Advised to monitor PSA every six months. Bladder scan performed. Discontinued Flomax due to side effects affecting sexual function but found it effective for urination. Plans to consult urologist about alternative medications. Follow-up with urologist in 05/2025 and plans another appointment in 10/2025.  - Onset: Recent follow-up with urologist.  - Character: Stable PSA levels, colon size within acceptable range, effective urination with Flomax.  - Alleviating/Aggravating Factors: Discontinued Flomax due to side effects affecting sexual function.  - Timing: Follow-up with urologist in 05/2025 and plans another appointment in 10/2025.    Allergies  Underwent allergy testing with referral valid until 12/2025 for six appointments. Allergic to trees, dog fur, pollens, grasses, and molds. Symptoms severe over past three months. Manages symptoms with Benadryl and generic Claritin. Increased Benadryl use during COVID-19 to prevent coughing.  - Onset: Severe symptoms over past three months.  - Character: Allergic to trees, dog fur, pollens, grasses, and molds.  - Alleviating/Aggravating Factors: Manages symptoms with Benadryl and generic Claritin; increased Benadryl use during COVID-19.    Health Maintenance  Up-to-date with vaccinations but overdue for three. Has cholesterol issues. Had colonoscopy in 2020, due for another in 2027.    Social History:  Hobbies: Golf  Sleep: Uses Benadryl to  "aid sleep when experiencing allergy symptoms    PAST SURGICAL HISTORY:  Colonoscopy in 2020    Problem   Elevated Psa    Elevated PSA  Follow with [facility] [place]  11/8/2024   MRI  1. Near diffuse decrease in peripheral T2 signal bilaterally, likely sequela of prostatitis. No focal lesions for targeted prostate biopsy.  2. BPH with gland volume of 43 cc.  3. No pelvic lymphadenopathy or suspicious osseous lesions.  4. Colonic diverticulosis.     Total PI-RADS score: 2 - Low (clinically significant cancer is unlikely to be present)          Current Outpatient Medications   Medication Sig    albuterol 108 (90 Base) MCG/ACT Aero Soln inhalation aerosol INHALE 2 PUFFS BY MOUTH EVERY 4 TO 6 HOURS AS NEEDED    tamsulosin (FLOMAX) 0.4 MG capsule Take 0.4 mg by mouth every day. (Patient not taking: Reported on 7/31/2025)     Allergies[1]    Allergies, past medical history, past surgical history, medications, family history, social history reviewed and updated.    ROS Please see HPI    Physical Exam  Vitals: /68 (BP Location: Left arm, Patient Position: Sitting, BP Cuff Size: Adult)   Pulse 82   Temp 37 °C (98.6 °F) (Temporal)   Resp 16   Ht 1.753 m (5' 9\")   Wt 76.5 kg (168 lb 9.6 oz)   SpO2 95%   BMI 24.90 kg/m²   Physical Exam  Constitutional:       General: He is not in acute distress.     Appearance: Normal appearance.   HENT:      Head: Normocephalic and atraumatic.      Nose: Nose normal.      Mouth/Throat:      Pharynx: Oropharynx is clear.   Eyes:      Extraocular Movements: Extraocular movements intact.      Conjunctiva/sclera: Conjunctivae normal.   Cardiovascular:      Rate and Rhythm: Normal rate and regular rhythm.   Pulmonary:      Effort: Pulmonary effort is normal.      Breath sounds: Normal breath sounds.   Abdominal:      General: Bowel sounds are normal.      Palpations: Abdomen is soft.      Tenderness: There is no abdominal tenderness.   Musculoskeletal:      Right lower leg: No edema.      " Left lower leg: No edema.   Neurological:      Mental Status: He is alert. Mental status is at baseline.   Psychiatric:         Mood and Affect: Mood normal.         Behavior: Behavior normal.         Thought Content: Thought content normal.         Judgment: Judgment normal.         Assessment and Plan    Assessment & Plan      1. Elevated PSA  - PROSTATE SPECIFIC AG DIAGNOSTIC; Future  Follow with urology   Latest Reference Range & Units 09/03/24 12:22 09/12/24 06:04 05/01/25 06:11   Prostatic Specific Antigen Tot 0.00 - 4.00 ng/mL 6.03 (H) 4.91 (H) 4.85 (H)   (H): Data is abnormally high  - Discontinued Flomax due to sexual side effects.  - Monitor PSA every six months.  - Follow-up with [facility] [place] in 10/2025 to discuss alternative medications like finasteride.    2. Benign prostatic hyperplasia, unspecified whether lower urinary tract symptoms present  Follow with urology    3. Pure hypercholesterolemia  - TSH WITH REFLEX TO FT4; Future  - Lipid Profile; Future  Healthful lifestyle measures    4. Diabetes mellitus screening  - Comp Metabolic Panel; Future    5. Allergy, initial encounter  - CBC WITH DIFFERENTIAL; Future  - Allergic to trees, dog fur, pollens, grasses, and molds.  - Uses generic Claritin (loratadine) for symptom relief.  - follow with allergy    6. Need for vaccination  - Pneumococcal Conjugate Vaccine 20-Valent (6 wks+)          Follow-up:Return in about 6 months (around 1/31/2026), or if symptoms worsen or fail to improve.    This note was created using voice recognition software. There may be unintended errors in spelling, grammar or content.           [1] No Known Allergies